# Patient Record
Sex: MALE | Race: WHITE
[De-identification: names, ages, dates, MRNs, and addresses within clinical notes are randomized per-mention and may not be internally consistent; named-entity substitution may affect disease eponyms.]

---

## 2018-07-26 ENCOUNTER — HOSPITAL ENCOUNTER (EMERGENCY)
Dept: HOSPITAL 89 - ER | Age: 23
Discharge: HOME | End: 2018-07-26
Payer: SELF-PAY

## 2018-07-26 ENCOUNTER — HOSPITAL ENCOUNTER (OUTPATIENT)
Dept: HOSPITAL 89 - AMB | Age: 23
End: 2018-07-26
Payer: COMMERCIAL

## 2018-07-26 VITALS — DIASTOLIC BLOOD PRESSURE: 57 MMHG | SYSTOLIC BLOOD PRESSURE: 119 MMHG

## 2018-07-26 VITALS — BODY MASS INDEX: 35.73 KG/M2

## 2018-07-26 DIAGNOSIS — F10.129: Primary | ICD-10-CM

## 2018-07-26 DIAGNOSIS — R10.12: ICD-10-CM

## 2018-07-26 DIAGNOSIS — R79.89: ICD-10-CM

## 2018-07-26 LAB — PLATELET COUNT, AUTOMATED: 223 K/UL (ref 150–450)

## 2018-07-26 PROCEDURE — 36415 COLL VENOUS BLD VENIPUNCTURE: CPT

## 2018-07-26 PROCEDURE — 82947 ASSAY GLUCOSE BLOOD QUANT: CPT

## 2018-07-26 PROCEDURE — 80320 DRUG SCREEN QUANTALCOHOLS: CPT

## 2018-07-26 PROCEDURE — 84460 ALANINE AMINO (ALT) (SGPT): CPT

## 2018-07-26 PROCEDURE — 84520 ASSAY OF UREA NITROGEN: CPT

## 2018-07-26 PROCEDURE — 82040 ASSAY OF SERUM ALBUMIN: CPT

## 2018-07-26 PROCEDURE — 82247 BILIRUBIN TOTAL: CPT

## 2018-07-26 PROCEDURE — 99283 EMERGENCY DEPT VISIT LOW MDM: CPT

## 2018-07-26 PROCEDURE — 83735 ASSAY OF MAGNESIUM: CPT

## 2018-07-26 PROCEDURE — 84155 ASSAY OF PROTEIN SERUM: CPT

## 2018-07-26 PROCEDURE — 96360 HYDRATION IV INFUSION INIT: CPT

## 2018-07-26 PROCEDURE — 84443 ASSAY THYROID STIM HORMONE: CPT

## 2018-07-26 PROCEDURE — 82374 ASSAY BLOOD CARBON DIOXIDE: CPT

## 2018-07-26 PROCEDURE — 84450 TRANSFERASE (AST) (SGOT): CPT

## 2018-07-26 PROCEDURE — 82565 ASSAY OF CREATININE: CPT

## 2018-07-26 PROCEDURE — 82310 ASSAY OF CALCIUM: CPT

## 2018-07-26 PROCEDURE — 85025 COMPLETE CBC W/AUTO DIFF WBC: CPT

## 2018-07-26 PROCEDURE — 84295 ASSAY OF SERUM SODIUM: CPT

## 2018-07-26 PROCEDURE — 84075 ASSAY ALKALINE PHOSPHATASE: CPT

## 2018-07-26 PROCEDURE — 84132 ASSAY OF SERUM POTASSIUM: CPT

## 2018-07-26 PROCEDURE — 83690 ASSAY OF LIPASE: CPT

## 2018-07-26 PROCEDURE — 80329 ANALGESICS NON-OPIOID 1 OR 2: CPT

## 2018-07-26 PROCEDURE — 82435 ASSAY OF BLOOD CHLORIDE: CPT

## 2018-07-26 NOTE — ER REPORT
History and Physical


Time Seen By MD:  16:50


Hx. of Stated Complaint:  


PATIENT WAS BROUGHT IN BY EMS; EMS SAY ROOM MATE OR FRIEND CALLED EMS FOR PT; 


FRIEND STATED TO THEM THAT PATIENT WAS KICKED OUT OF PARENTS HOUSE LAST WEEK? 


PATIENT HAS BEEN DRINKING SINCE THEN; PATIENT STATED THAT HE HAD 9/10 UPPER 

LEFT 


EPIGASTRIC PAIN; PATIENT WAS GIVEN 4MG OF ZOFRAN AND BLOOD SUGAR WAS 91 BY EMS; 


ON ARRIVAL PT WAS SLEEPING "PASSED OUT"


HPI/ROS


CHIEF COMPLAINT: Alcohol Intoxication





HISTORY OF PRESENT ILLNESS: 23-year-old male patient presents to emergency room 

with complaint of alcohol intoxication. Patient states that today he drank 2 

bottles of vodka. Patient states that he is alcoholic and likes to drink. 

Patient states that he passed out. A friend of his found him passed out. He is 

concerned about possible alcohol poisoning. EMS was contacted. When they 

arrived patient states he was having abdominal pain. And agreed to come to the 

emergency room. Patient states he's not had any nausea, vomiting or diarrhea. 

Patient states pain is in the left upper quadrant. Patient states is nothing 

seems to make the pain better although it is worse with palpation. Patient 

denies any loss of bowel or bladder control.





REVIEW OF SYSTEMS:


Respiratory: No cough, no dyspnea.


Cardiovascular: No chest pain, no palpitations.


Gastrointestinal: As noted above


Musculoskeletal: No back pain.


Allergies:  


Coded Allergies:  


     No Known Drug Allergies (Unverified , 7/26/18)


Past Medical/Surgical History


Patient has a past medical history of alcohol abuse.


Unable To Obtain Past Medical:  Unable to Obtain/Update


Reviewed Nurses Notes:  Yes


Constitutional





Vital Sign - Last 24 Hours








 7/26/18





 16:44


 


Temp 98.3


 


Pulse 119


 


Resp 19


 


B/P (MAP) 119/57


 


Pulse Ox 90


 


O2 Delivery Room Air














Intake and Output   


 


 7/26/18 7/26/18 7/27/18





 15:00 23:00 07:00


 


Intake Total  1000 ml 


 


Balance  1000 ml 








Physical Exam


  General Appearance: The patient is somnolent but arouses to stimulation, has 

no immediate need for airway protection and no current signs of toxicity.


Respiratory: Chest is non tender, lungs are clear to auscultation.


Cardiac: regular rate and rhythm


Gastrointestinal: Abdomen is soft and tender in the left upper quadrant, no 

masses, bowel sounds normal.


Musculoskeletal:  Neck: Neck is supple and non tender.


   Extremities have full range of motion and are non tender.


Skin: No rashes or lesions.





DIFFERENTIAL DIAGNOSIS: After history and physical exam differential diagnosis 

was considered for pancreatitis, alcohol abuse, alcohol induced gastritis.





Medical Decision Making


Data Points


Result Diagram:  


7/26/18 1727                                                                   

             7/26/18 1727





Laboratory





Hematology








Test


  7/26/18


17:27


 


Red Blood Count


  5.05 M/uL


(4.00-5.60)


 


Mean Corpuscular Volume


  89.1 fL


(80.0-96.0)


 


Mean Corpuscular Hemoglobin


  31.1 pg


(26.0-33.0)


 


Mean Corpuscular Hemoglobin


Concent 34.9 g/dL


(32.0-36.0)


 


Red Cell Distribution Width


  14.4 %


(11.5-14.5)


 


Mean Platelet Volume


  9.3 fL


(7.2-11.1)


 


Neutrophils (%) (Auto)


  68.7 %


(39.4-72.5)


 


Lymphocytes (%) (Auto)


  23.3 %


(17.6-49.6)


 


Monocytes (%) (Auto)


  5.2 %


(4.1-12.4)


 


Eosinophils (%) (Auto)


  1.2 %


(0.4-6.7)


 


Basophils (%) (Auto)


  1.6 %


(0.3-1.4)


 


Nucleated RBC Relative Count


(auto) 0.0 /100WBC 


 


 


Neutrophils # (Auto)


  4.1 K/uL


(2.0-7.4)


 


Lymphocytes # (Auto)


  1.4 K/uL


(1.3-3.6)


 


Monocytes # (Auto)


  0.3 K/uL


(0.3-1.0)


 


Eosinophils # (Auto)


  0.1 K/uL


(0.0-0.5)


 


Basophils # (Auto)


  0.1 K/uL


(0.0-0.1)


 


Nucleated RBC Absolute Count


(auto) 0.00 K/uL 


 


 


Sodium Level


  143 mmol/L


(137-145)


 


Potassium Level


  3.6 mmol/L


(3.5-5.0)


 


Chloride Level


  110 mmol/L


()


 


Carbon Dioxide Level


  22 mmol/L


(22-30)


 


Blood Urea Nitrogen


  14 mg/dl


(9-21)


 


Creatinine


  1.00 mg/dl


(0.66-1.25)


 


Glomerular Filtration Rate


Calc > 60.0 


 


 


Random Glucose


  85 mg/dl


()


 


Calcium Level


  7.7 mg/dl


(8.4-10.2)


 


Magnesium Level


  2.1 mg/dl


(1.7-2.2)


 


Total Bilirubin


  0.3 mg/dl


(0.2-1.3)


 


Aspartate Amino Transf


(AST/SGOT) 51 U/L (0-35) 


 


 


Alanine Aminotransferase


(ALT/SGPT) 32 U/L (0-56) 


 


 


Alkaline Phosphatase 50 U/L (0-126) 


 


Total Protein


  5.6 g/dl


(6.3-8.2)


 


Albumin


  3.6 g/dl


(3.5-5.0)


 


Lipase


  51 U/L


()


 


Salicylates Level < 10 mg/L 


 


Salicylate Last Dose Date unk 


 


Acetaminophen Level < 10 ug/ml 


 


Serum Alcohol 255 mg/dl 








Chemistry








Test


  7/26/18


17:27


 


White Blood Count


  6.0 k/uL


(4.5-11.0)


 


Red Blood Count


  5.05 M/uL


(4.00-5.60)


 


Hemoglobin


  15.7 g/dL


(14.0-18.0)


 


Hematocrit


  45.0 %


(42.0-52.0)


 


Mean Corpuscular Volume


  89.1 fL


(80.0-96.0)


 


Mean Corpuscular Hemoglobin


  31.1 pg


(26.0-33.0)


 


Mean Corpuscular Hemoglobin


Concent 34.9 g/dL


(32.0-36.0)


 


Red Cell Distribution Width


  14.4 %


(11.5-14.5)


 


Platelet Count


  223 K/uL


(150-450)


 


Mean Platelet Volume


  9.3 fL


(7.2-11.1)


 


Neutrophils (%) (Auto)


  68.7 %


(39.4-72.5)


 


Lymphocytes (%) (Auto)


  23.3 %


(17.6-49.6)


 


Monocytes (%) (Auto)


  5.2 %


(4.1-12.4)


 


Eosinophils (%) (Auto)


  1.2 %


(0.4-6.7)


 


Basophils (%) (Auto)


  1.6 %


(0.3-1.4)


 


Nucleated RBC Relative Count


(auto) 0.0 /100WBC 


 


 


Neutrophils # (Auto)


  4.1 K/uL


(2.0-7.4)


 


Lymphocytes # (Auto)


  1.4 K/uL


(1.3-3.6)


 


Monocytes # (Auto)


  0.3 K/uL


(0.3-1.0)


 


Eosinophils # (Auto)


  0.1 K/uL


(0.0-0.5)


 


Basophils # (Auto)


  0.1 K/uL


(0.0-0.1)


 


Nucleated RBC Absolute Count


(auto) 0.00 K/uL 


 


 


Glomerular Filtration Rate


Calc > 60.0 


 


 


Calcium Level


  7.7 mg/dl


(8.4-10.2)


 


Magnesium Level


  2.1 mg/dl


(1.7-2.2)


 


Total Bilirubin


  0.3 mg/dl


(0.2-1.3)


 


Aspartate Amino Transf


(AST/SGOT) 51 U/L (0-35) 


 


 


Alanine Aminotransferase


(ALT/SGPT) 32 U/L (0-56) 


 


 


Alkaline Phosphatase 50 U/L (0-126) 


 


Total Protein


  5.6 g/dl


(6.3-8.2)


 


Albumin


  3.6 g/dl


(3.5-5.0)


 


Lipase


  51 U/L


()


 


Salicylates Level < 10 mg/L 


 


Salicylate Last Dose Date unk 


 


Acetaminophen Level < 10 ug/ml 


 


Serum Alcohol 255 mg/dl 








Toxicology








Test


  7/26/18


17:27


 


Salicylates Level < 10 mg/L 


 


Salicylate Last Dose Date unk 


 


Acetaminophen Level < 10 ug/ml 


 


Serum Alcohol 255 mg/dl 











ED Course/Re-evaluation


ED Course


Patient was admitted and examined, history of physical were obtained. 

Differential diagnoses were considered. On examination lungs are clear, heart 

is regular, abdomen soft and tender in the left upper quadrant. Labs for a 

behavioral health admission were done. Included in those labs were a lipase. 

Labs were unremarkable, although he did have a slightly elevated AST. His 

lipase was 51. At this time I do not believe patient has a pancreatitis. I 

discussed admission to behavioral health with the patient. He states that he 

would like to do that. I spoke with Dr. Smith, who agreed to accept the 

patient for admission. When the behavioral health tech came down to talk with 

the patient and he felt that there was a loculated patient refused this time. 

He states he would like to go home. I discussed with him that he does need to 

make some changes in his life. I would encourage him to avoid drinking. He is 

return to emergency room if condition worsens. Patient verbalized understanding 

and agreement with plan.


Decision to Disposition Date:  Jul 26, 2018


Decision to Disposition Time:  19:35





Depart


Departure


Latest Vital Signs





Vital Signs








  Date Time  Temp Pulse Resp B/P (MAP) Pulse Ox O2 Delivery O2 Flow Rate FiO2


 


7/26/18 16:44 98.3 119 19 119/57 90 Room Air  








Impression:  


 Primary Impression:  


 Alcohol abuse


Condition:  Condition Unchanged


Disposition:  HOME OR SELF-CARE


Patient Instructions:  Abuse of Alcohol (ED)





Additional Instructions:  


Avoid alcohol.


Get plenty of rest.


Return to the ER if you feel like you would like to be admitted to detox from 

alcohol.


Follow up with your counselor tomorrow or early next week.











MIR DUONG Jul 26, 2018 16:53

## 2018-07-29 ENCOUNTER — HOSPITAL ENCOUNTER (INPATIENT)
Dept: HOSPITAL 89 - ER | Age: 23
LOS: 1 days | Discharge: PSYCHIATRIC OR SUBSTANCE USE FACILITY | DRG: 897 | End: 2018-07-30
Attending: INTERNAL MEDICINE | Admitting: INTERNAL MEDICINE
Payer: COMMERCIAL

## 2018-07-29 ENCOUNTER — HOSPITAL ENCOUNTER (OUTPATIENT)
Dept: HOSPITAL 89 - AMB | Age: 23
End: 2018-07-29
Payer: COMMERCIAL

## 2018-07-29 VITALS — SYSTOLIC BLOOD PRESSURE: 123 MMHG | DIASTOLIC BLOOD PRESSURE: 69 MMHG

## 2018-07-29 VITALS
BODY MASS INDEX: 35.84 KG/M2 | HEIGHT: 69 IN | WEIGHT: 242 LBS | HEIGHT: 69 IN | BODY MASS INDEX: 35.84 KG/M2 | WEIGHT: 242 LBS

## 2018-07-29 VITALS — SYSTOLIC BLOOD PRESSURE: 142 MMHG | DIASTOLIC BLOOD PRESSURE: 76 MMHG

## 2018-07-29 VITALS — BODY MASS INDEX: 35.73 KG/M2

## 2018-07-29 VITALS — SYSTOLIC BLOOD PRESSURE: 137 MMHG | DIASTOLIC BLOOD PRESSURE: 63 MMHG

## 2018-07-29 VITALS — SYSTOLIC BLOOD PRESSURE: 135 MMHG | DIASTOLIC BLOOD PRESSURE: 65 MMHG

## 2018-07-29 DIAGNOSIS — F10.951: Primary | ICD-10-CM
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
## 2018-07-29 DIAGNOSIS — F12.10: ICD-10-CM

## 2018-07-29 DIAGNOSIS — R53.1: ICD-10-CM

## 2018-07-29 DIAGNOSIS — R00.0: ICD-10-CM

## 2018-07-29 DIAGNOSIS — F10.231: Primary | ICD-10-CM

## 2018-07-29 DIAGNOSIS — R61: ICD-10-CM

## 2018-07-29 DIAGNOSIS — F17.210: ICD-10-CM

## 2018-07-29 LAB — PLATELET COUNT, AUTOMATED: 296 K/UL (ref 150–450)

## 2018-07-29 PROCEDURE — 84450 TRANSFERASE (AST) (SGOT): CPT

## 2018-07-29 PROCEDURE — 84460 ALANINE AMINO (ALT) (SGPT): CPT

## 2018-07-29 PROCEDURE — 80305 DRUG TEST PRSMV DIR OPT OBS: CPT

## 2018-07-29 PROCEDURE — 82565 ASSAY OF CREATININE: CPT

## 2018-07-29 PROCEDURE — 82374 ASSAY BLOOD CARBON DIOXIDE: CPT

## 2018-07-29 PROCEDURE — 81001 URINALYSIS AUTO W/SCOPE: CPT

## 2018-07-29 PROCEDURE — 82435 ASSAY OF BLOOD CHLORIDE: CPT

## 2018-07-29 PROCEDURE — 84075 ASSAY ALKALINE PHOSPHATASE: CPT

## 2018-07-29 PROCEDURE — 82247 BILIRUBIN TOTAL: CPT

## 2018-07-29 PROCEDURE — 80320 DRUG SCREEN QUANTALCOHOLS: CPT

## 2018-07-29 PROCEDURE — 84295 ASSAY OF SERUM SODIUM: CPT

## 2018-07-29 PROCEDURE — 83690 ASSAY OF LIPASE: CPT

## 2018-07-29 PROCEDURE — 82947 ASSAY GLUCOSE BLOOD QUANT: CPT

## 2018-07-29 PROCEDURE — 83735 ASSAY OF MAGNESIUM: CPT

## 2018-07-29 PROCEDURE — 82310 ASSAY OF CALCIUM: CPT

## 2018-07-29 PROCEDURE — 85025 COMPLETE CBC W/AUTO DIFF WBC: CPT

## 2018-07-29 PROCEDURE — 80329 ANALGESICS NON-OPIOID 1 OR 2: CPT

## 2018-07-29 PROCEDURE — 84155 ASSAY OF PROTEIN SERUM: CPT

## 2018-07-29 PROCEDURE — 84520 ASSAY OF UREA NITROGEN: CPT

## 2018-07-29 PROCEDURE — 84132 ASSAY OF SERUM POTASSIUM: CPT

## 2018-07-29 PROCEDURE — 93005 ELECTROCARDIOGRAM TRACING: CPT

## 2018-07-29 PROCEDURE — 82040 ASSAY OF SERUM ALBUMIN: CPT

## 2018-07-29 RX ADMIN — NICOTINE PRN MG: 4 INHALANT RESPIRATORY (INHALATION) at 18:02

## 2018-07-29 RX ADMIN — NICOTINE PRN MG: 4 INHALANT RESPIRATORY (INHALATION) at 22:05

## 2018-07-29 NOTE — EKG
[place]AC[place] [facility] [place] 

 

PATIENT NAME: CONCEPCION BRITO

: [id number]

MR: [id number]

V: [id number]

EXAM DATE: 

ORDERING PHYSICIAN: MACARIO HUYNH

TECHNOLOGIST: 

 

Test Reason : 

Blood Pressure : ***/*** mmHG

Vent. Rate : 115 BPM     Atrial Rate : 115 BPM

   P-R Int : 164 ms          QRS Dur : 094 ms

    QT Int : 328 ms       P-R-T Axes : 060 085 040 degrees

   QTc Int : 453 ms

 

Sinus tachycardia

Otherwise normal ECG

No previous ECGs available

Confirmed by AKANKSHA MORENO ([id number]) on 2018 9:26:03 PM

 

Referred By:             Confirmed By:AKANKSHA MORENO

## 2018-07-29 NOTE — ER REPORT
History and Physical


Time Seen By MD:  16:27


Hx. of Stated Complaint:  


DETOX - HALLUCINATIONS


HPI/ROS


CHIEF COMPLAINT: Detoxing, hallucinating





HISTORY OF PRESENT ILLNESS: Pt was seen 2 d ago for detox admission and eloped b

/c he could not smoke, states last drink was 12 hrs ago, drinks 1/2 gallon etoh 

daily, admits to mj, but denies other drugs. Denies si, now admits to visual 

hallucinations, skin crawling, agitation. Denies ha, n/v/injuries





REVIEW OF SYSTEMS:


Constitutional: No fever, no chills.


Eyes: No discharge.


ENT: No sore throat. 


Cardiovascular: No chest pain, + palpitations.


Respiratory: No cough, no shortness of breath.


Gastrointestinal: No abdominal pain, no vomiting.


Genitourinary: No hematuria.


Musculoskeletal: No back pain.


Skin: No rashes.


Neurological: mild ha


Allergies:  


Coded Allergies:  


     No Known Drug Allergies (Unverified , 7/26/18)


Hx Smoking:  Yes


Constitutional





Vital Sign - Last 24 Hours








 7/29/18





 16:13


 


Pulse 114


 


Resp 18


 


B/P (MAP) 146/85


 


Pulse Ox 94


 


O2 Delivery Room Air








Physical Exam


   General Appearance: The patient is alert, has no immediate need for airway 

protection and no signs of toxicity., however is agitated, tachycardic, mildly 

diaphoretic


Eyes: Pupils equal and round no pallor or injection. bilat horizontal nystagmus


ENT, Mouth: Mucous membranes are moist.


Respiratory: There are no retractions, lungs are clear to auscultation.


Cardiovascular: tachycardia


Gastrointestinal:  Abdomen is soft and non tender, no masses, bowel sounds 

normal.


Neurological: alert, mildly confused, responding to visual hallucinations


Skin: mild diaphoretic


Musculoskeletal:  Neck is supple non tender.


      Extremities are nontender, nonswollen and have full range of motion.





DIFFERENTIAL DIAGNOSIS: After history and physical exam differential diagnosis 

was considered for drug/etoh od/withdrawal, toxic/metabolic/septic etiology





Medical Decision Making


Data Points


Result Diagram:  


7/29/18 1606                                                                   

             7/29/18 1606





Laboratory





Hematology








Test


  7/29/18


16:06 7/29/18


17:19


 


Red Blood Count


  5.77 M/uL


(4.00-5.60) 


 


 


Mean Corpuscular Volume


  87.2 fL


(80.0-96.0) 


 


 


Mean Corpuscular Hemoglobin


  30.8 pg


(26.0-33.0) 


 


 


Mean Corpuscular Hemoglobin


Concent 35.3 g/dL


(32.0-36.0) 


 


 


Red Cell Distribution Width


  14.1 %


(11.5-14.5) 


 


 


Mean Platelet Volume


  8.2 fL


(7.2-11.1) 


 


 


Neutrophils (%) (Auto)


  71.7 %


(39.4-72.5) 


 


 


Lymphocytes (%) (Auto)


  20.5 %


(17.6-49.6) 


 


 


Monocytes (%) (Auto)


  5.5 %


(4.1-12.4) 


 


 


Eosinophils (%) (Auto)


  0.9 %


(0.4-6.7) 


 


 


Basophils (%) (Auto)


  1.4 %


(0.3-1.4) 


 


 


Nucleated RBC Relative Count


(auto) 0.1 /100WBC 


  


 


 


Neutrophils # (Auto)


  5.5 K/uL


(2.0-7.4) 


 


 


Lymphocytes # (Auto)


  1.6 K/uL


(1.3-3.6) 


 


 


Monocytes # (Auto)


  0.4 K/uL


(0.3-1.0) 


 


 


Eosinophils # (Auto)


  0.1 K/uL


(0.0-0.5) 


 


 


Basophils # (Auto)


  0.1 K/uL


(0.0-0.1) 


 


 


Nucleated RBC Absolute Count


(auto) 0.01 K/uL 


  


 


 


Sodium Level


  144 mmol/L


(137-145) 


 


 


Potassium Level


  3.6 mmol/L


(3.5-5.0) 


 


 


Chloride Level


  106 mmol/L


() 


 


 


Carbon Dioxide Level


  19 mmol/L


(22-30) 


 


 


Blood Urea Nitrogen


  12 mg/dl


(9-21) 


 


 


Creatinine


  1.10 mg/dl


(0.66-1.25) 


 


 


Glomerular Filtration Rate


Calc > 60.0 


  


 


 


Random Glucose


  132 mg/dl


() 


 


 


Calcium Level


  8.6 mg/dl


(8.4-10.2) 


 


 


Magnesium Level


  1.9 mg/dl


(1.7-2.2) 


 


 


Total Bilirubin


  0.4 mg/dl


(0.2-1.3) 


 


 


Aspartate Amino Transf


(AST/SGOT) 79 U/L (0-35) 


  


 


 


Alanine Aminotransferase


(ALT/SGPT) 55 U/L (0-56) 


  


 


 


Alkaline Phosphatase 80 U/L (0-126)  


 


Total Protein


  6.3 g/dl


(6.3-8.2) 


 


 


Albumin


  4.3 g/dl


(3.5-5.0) 


 


 


Lipase


  71 U/L


() 


 


 


Salicylates Level < 10 mg/L  


 


Salicylate Last Dose Date unk  


 


Acetaminophen Level < 10 ug/ml  


 


Serum Alcohol 324 mg/dl  








Chemistry








Test


  7/29/18


16:06 7/29/18


17:19


 


White Blood Count


  7.6 k/uL


(4.5-11.0) 


 


 


Red Blood Count


  5.77 M/uL


(4.00-5.60) 


 


 


Hemoglobin


  17.8 g/dL


(14.0-18.0) 


 


 


Hematocrit


  50.3 %


(42.0-52.0) 


 


 


Mean Corpuscular Volume


  87.2 fL


(80.0-96.0) 


 


 


Mean Corpuscular Hemoglobin


  30.8 pg


(26.0-33.0) 


 


 


Mean Corpuscular Hemoglobin


Concent 35.3 g/dL


(32.0-36.0) 


 


 


Red Cell Distribution Width


  14.1 %


(11.5-14.5) 


 


 


Platelet Count


  296 K/uL


(150-450) 


 


 


Mean Platelet Volume


  8.2 fL


(7.2-11.1) 


 


 


Neutrophils (%) (Auto)


  71.7 %


(39.4-72.5) 


 


 


Lymphocytes (%) (Auto)


  20.5 %


(17.6-49.6) 


 


 


Monocytes (%) (Auto)


  5.5 %


(4.1-12.4) 


 


 


Eosinophils (%) (Auto)


  0.9 %


(0.4-6.7) 


 


 


Basophils (%) (Auto)


  1.4 %


(0.3-1.4) 


 


 


Nucleated RBC Relative Count


(auto) 0.1 /100WBC 


  


 


 


Neutrophils # (Auto)


  5.5 K/uL


(2.0-7.4) 


 


 


Lymphocytes # (Auto)


  1.6 K/uL


(1.3-3.6) 


 


 


Monocytes # (Auto)


  0.4 K/uL


(0.3-1.0) 


 


 


Eosinophils # (Auto)


  0.1 K/uL


(0.0-0.5) 


 


 


Basophils # (Auto)


  0.1 K/uL


(0.0-0.1) 


 


 


Nucleated RBC Absolute Count


(auto) 0.01 K/uL 


  


 


 


Glomerular Filtration Rate


Calc > 60.0 


  


 


 


Calcium Level


  8.6 mg/dl


(8.4-10.2) 


 


 


Magnesium Level


  1.9 mg/dl


(1.7-2.2) 


 


 


Total Bilirubin


  0.4 mg/dl


(0.2-1.3) 


 


 


Aspartate Amino Transf


(AST/SGOT) 79 U/L (0-35) 


  


 


 


Alanine Aminotransferase


(ALT/SGPT) 55 U/L (0-56) 


  


 


 


Alkaline Phosphatase 80 U/L (0-126)  


 


Total Protein


  6.3 g/dl


(6.3-8.2) 


 


 


Albumin


  4.3 g/dl


(3.5-5.0) 


 


 


Lipase


  71 U/L


() 


 


 


Salicylates Level < 10 mg/L  


 


Salicylate Last Dose Date unk  


 


Acetaminophen Level < 10 ug/ml  


 


Serum Alcohol 324 mg/dl  








Toxicology








Test


  7/29/18


16:06 7/29/18


17:19


 


Salicylates Level < 10 mg/L  


 


Salicylate Last Dose Date unk  


 


Acetaminophen Level < 10 ug/ml  


 


Serum Alcohol 324 mg/dl  








Urinalysis








Test


  7/29/18


17:19











EKG/Imaging


EKG Interpretation


12 lead EKG:


      Rhythm:sinus tachycardia


      Axis: normal 


      QRS: normal


      ST segments: normal


      [ ]


Monitor Interpretation:  Sinus Tachycardia





ED Course/Re-evaluation


ED Course


Pt presents with hallucinations from etoh use/withdrawal. Tachcyardia; 

considered other etiologies of ams, but likely assoc with etoh.  consulted 

and plan to admit


I spoke with pt's mother, Juhi, at his request - [phone]; she asks to be 

called for any concerns; states that when he's previously gone through DT's he'

s become violent/combative and been a flight risk. As far as she knows, he 

drinks etoh and other than MJ, no drugs.  Pt is cooperative in ED, but does ask 

at one point to be taken to the airport to fly to [place]. Given hallucinations

, need for occasional redirection, tachycardia, need for IV, D/w Dr. Nance but 

pt requires medical admission until stable. Accepted at 1730 by Dr. Durbin.


Decision to Disposition Date:  Jul 29, 2018


Decision to Disposition Time:  17:30





Depart


Departure


Latest Vital Signs





Vital Signs








  Date Time  Temp Pulse Resp B/P (MAP) Pulse Ox O2 Delivery O2 Flow Rate FiO2


 


7/29/18 16:13  114 18 146/85 94 Room Air  








Impression:  


 Primary Impression:  


 Alcohol intoxication delirium


 Additional Impression:  


 Delirium tremens


Condition:  Condition Unchanged


Disposition:  Admitted from ER





Problem Qualifiers











MACARIO HUYNH MD Jul 29, 2018 16:41

## 2018-07-30 ENCOUNTER — HOSPITAL ENCOUNTER (INPATIENT)
Dept: HOSPITAL 89 - BHS | Age: 23
LOS: 3 days | Discharge: HOME | DRG: 897 | End: 2018-08-02
Attending: PSYCHIATRY & NEUROLOGY | Admitting: PSYCHIATRY & NEUROLOGY
Payer: COMMERCIAL

## 2018-07-30 VITALS — SYSTOLIC BLOOD PRESSURE: 144 MMHG | DIASTOLIC BLOOD PRESSURE: 80 MMHG

## 2018-07-30 VITALS
HEIGHT: 69 IN | BODY MASS INDEX: 34.07 KG/M2 | HEIGHT: 69 IN | BODY MASS INDEX: 34.07 KG/M2 | WEIGHT: 230 LBS | WEIGHT: 230 LBS

## 2018-07-30 VITALS — SYSTOLIC BLOOD PRESSURE: 147 MMHG | DIASTOLIC BLOOD PRESSURE: 92 MMHG

## 2018-07-30 VITALS — DIASTOLIC BLOOD PRESSURE: 50 MMHG | SYSTOLIC BLOOD PRESSURE: 113 MMHG

## 2018-07-30 VITALS — DIASTOLIC BLOOD PRESSURE: 89 MMHG | SYSTOLIC BLOOD PRESSURE: 154 MMHG

## 2018-07-30 VITALS — DIASTOLIC BLOOD PRESSURE: 89 MMHG | SYSTOLIC BLOOD PRESSURE: 155 MMHG

## 2018-07-30 VITALS — SYSTOLIC BLOOD PRESSURE: 126 MMHG | DIASTOLIC BLOOD PRESSURE: 57 MMHG

## 2018-07-30 VITALS — DIASTOLIC BLOOD PRESSURE: 87 MMHG | SYSTOLIC BLOOD PRESSURE: 147 MMHG

## 2018-07-30 VITALS — DIASTOLIC BLOOD PRESSURE: 86 MMHG | SYSTOLIC BLOOD PRESSURE: 152 MMHG

## 2018-07-30 VITALS — SYSTOLIC BLOOD PRESSURE: 147 MMHG | DIASTOLIC BLOOD PRESSURE: 75 MMHG

## 2018-07-30 VITALS — SYSTOLIC BLOOD PRESSURE: 132 MMHG | DIASTOLIC BLOOD PRESSURE: 62 MMHG
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
## 2018-07-30 DIAGNOSIS — F10.231: Primary | ICD-10-CM

## 2018-07-30 DIAGNOSIS — F34.1: ICD-10-CM

## 2018-07-30 DIAGNOSIS — I10: ICD-10-CM

## 2018-07-30 DIAGNOSIS — G47.30: ICD-10-CM

## 2018-07-30 DIAGNOSIS — F12.20: ICD-10-CM

## 2018-07-30 RX ADMIN — DIAZEPAM PRN MG: 10 TABLET ORAL at 10:40

## 2018-07-30 RX ADMIN — DIAZEPAM PRN MG: 10 TABLET ORAL at 20:28

## 2018-07-30 RX ADMIN — DIAZEPAM PRN MG: 10 TABLET ORAL at 21:21

## 2018-07-30 RX ADMIN — DIAZEPAM PRN MG: 10 TABLET ORAL at 22:54

## 2018-07-30 RX ADMIN — NICOTINE PRN MG: 4 INHALANT RESPIRATORY (INHALATION) at 19:58

## 2018-07-30 RX ADMIN — DIAZEPAM PRN MG: 10 TABLET ORAL at 09:38

## 2018-07-30 RX ADMIN — DIAZEPAM PRN MG: 10 TABLET ORAL at 08:37

## 2018-07-30 NOTE — HOSPITALIST DEPART
Discharge Summary


Reason for Hosp/Final Diag:  


(1) Alcohol abuse


Status:  Chronic


Hospital Course & Plan:  The patient has a long history of etoh abuse and has 

been to alcohol rehab 3 times. He presented for help for detoxification.





(2) Delirium tremens


Status:  Acute


Hospital Course & Plan:  He continued to have hallucinations this morning, but 

cleared later this afternoon after several doses of Valium.  He is receiving 

thiamine.  He requested to leave against medical advice, but was found to be a 

risk to himself after evaluation by the mental health counselor.  He is being 

detained to [facility] for further evaluation.





Departure


Weight (Pounds):  242


Result Diagram:  


7/29/18 1606                                                                   

             7/29/18 1606





Condition:  No Change


Discharge:  [facility]





Discharge Instructions


Diet:  Regular


Activity:  As Tolerated


Special Instructions:  








Venous Thromboembolism


Antithrombotics


Is Pt On Any Antithrombotics?:  No











MELISA MEYERS DO Jul 30, 2018 17:39

## 2018-07-30 NOTE — [facility] - PSYCHIATRIC EVALUATION
ER - Title 25 MHE Evaluation


Title 25 Evaluation


Patient Detained By:  Therapist ([facility]


Referral Source:  Dr. Guerrero, Hospitalist & Sejal Hamilton, Licensed Addiction 

Therapist


Date Patient Detained:  Jul 30, 2018


Time Patient Detained:  17:17


Date FPC Expires:  Aug 2, 2018


Time [facility] Expires:  17:17


Legal Status: Police Hold:  No


Legal Status: Residence:  State Resident, Other (From [facility] [place])


Assessment Data Provided By:  Patient, Other Provider (Dr. Guerrero, Hospitalist), 

Other Source (Patient's Electronic record)


HPI/ROS:  


Assessment from Dr. Guerrero: 


"(1) Alcohol abuse


Status:  Chronic


Hospital Course & Plan:  The patient has a long history of etoh abuse and


has been to alcohol rehab 3 times. He presented for help for


detoxification.





(2) Delirium tremens


Status:  Acute


Hospital Course & Plan:  He continued to have hallucinations this morning,


but cleared later this afternoon after several doses of Valium.  He is


receiving thiamine.  He requested to leave against medical advice, but was


found to be a risk to himself after evaluation by the mental health


counselor.  He is being detained to [facility] for further evaluation."


Admit due to SI or Attempt:  No


Suicide Plan:  No Plan


Current Suicide Plan


Denies any plan


Alcohol or Drugs Involved:  Yes


Current Intoxication Info:


Patient taking a long while to safely detox


Is Patient Info Reliable:  Yes


Is Collateral Info Reliable:  Yes


Current Home Psych Meds:


None currently that are known.





Mental Status Exam


General Appearance:  Cooperative, Polite, Good Interaction, Tearful (patient 

apologized for crying.)


Speech:  Clear, Garbled (Occassionally mixes up words related to detox)


Mood:  Dysthmic/Depressed


Affect:  Sad, Tearful, Anxious


Thought Process:  Goal Directed (Wants to leave.)


Thought Content:  Suicidal Ideation (Denies), Visual Hallucinations (Says 

before he was admitted he was hallucinating because he had no alcohol, so he 

was forced into an unsafe detox before he came to the hospital and was admitted.

)


Cognition:  Alert & Oriented-Person, Alert & Oriented-Place, No Alert & Oriented

-Time, No Alert-Oriented-Situation


Memory:  Immediate, Recent, Remote


Insight Judgment:  Poor


Hallucinations:  Visual (Reports visual hallucinations led him to believe 

hospitalization was important for his safety.)





Current Risk & History


Current Dangerous Risk Assessm:  Current Suicide Ideation (Denies), Self-

Injurious Behaviors (Dangerous for patient to leave the hospital before safely 

detoxed.), Ubable to Care for Self (Is not totally aware of how unsafe his 

condition is outside of a hospital where he can be safely detoxed.)


Past Dangerous Risk Assessm:  Other (Denies)


Prior Alcohol/Drug Abuse


Long history of alcohol and marijuana use.  Says he was court ordered to do 

residential treatment one time because he was honest with his  

about using drugs.


Sequelae of Substance Abuse:


Patient says he has been drinking heavily for the last three years because he 

says he has anxiety and depression.


Previous Suicide Attempt:  No Previous Attempt


Previous Psychiatric Illness:  Yes (Patient says he has been hospitalized in 

[place], [place] before.)


Previous Psychiatric Treatment:  Yes (Reports previous treatment for Anxiety, 

Depression and Alcohol Use Disorder severe.)


Previous Treatment Description


Patient says he has been to inpatient rehab four times.  He says tearfully, 

"Something's got to change."





Risk Assessment & Disposition


Evaluated Risk Assessment:


Patient said he wanted to leave the hospital against medical advice.  says, "I'

m here because I am stubborn." Now recognizes, "I'm not ready to go.  I can 

tell by the way I am not all there with my thoughts and the way I am walking 

that I am not ready.  I just hate hospitals so much."


Impression:  


 Primary Impression:  


 Alcohol abuse


 Additional Impressions:  


 Delirium tremens


 Substance abuse


Meets Mental Illness Req.:  Yes


Meets Dangerousness Req.:  Yes


Emergency FPC to be:  Upheld


Decision Comment:


Patient recently had a very bad fight with his father, is kicked out of his home

, homeless, and away from his weekly outpatient therapy support.  He is very 

sad and unable to detox on his own safely.  he runs the risk of dying if he is 

not detoxed safely.  Leaving against medical advice is quite life threatening 

for this patient.


Date of Decision:  Jul 30, 2018


Time of Decision:  20:17


Patient is Medically Stable at:  Yes


Disposition:  [facility]





Problem Qualifiers











SARI NEVAREZ LPC Jul 30, 2018 20:20

## 2018-07-30 NOTE — HOSPITALIST PROGRESS NOTE
Subjective


Progress Notes


Subjective


He was admitted for alcohol withdraw. He had no acute events overnight. He is 

still scoring 16 and above on his CIWA assessments.





Patient Complains of:


Cardiovascular:  No: Chest Pain


Respiratory:  No: Shortness of Breath





Physical Exam





Vital Signs








  Date Time  Temp Pulse Resp B/P (MAP) Pulse Ox O2 Delivery O2 Flow Rate FiO2


 


7/30/18 08:55     92   


 


7/30/18 07:28  88      


 


7/30/18 07:28      Room Air  


 


7/30/18 06:09 98.1  28 154/89 (110)   2.0 














Intake and Output 


 


 7/31/18





 01:00


 


Intake Total 1770 ml


 


Balance 1770 ml


 


 


 


Intake Oral 1770 ml


 


# Voids 1








General Appearance:  Alert, Awake, No Acute Distress


Cardiovascular:  Regular Rate and Rhythm


Respiratory:  No Respiratory Distress, Clear to Auscultation


GI:  Soft and Non-Tender


Psych:  Alert & Oriented X3, Appropriate Mood & Affect


Result Diagram:  


7/29/18 1606                                                                   

             7/29/18 1606





Monitor Interpretation:  Sinus Tachycardia





Assessment and Plan


Problems:  


(1) Alcohol abuse


Status:  Chronic


Assessment & Plan:  The patient has a long history of etoh abuse and has been 

to alcohol rehab 3 times. He would like to detox and then go to [place] for 

rehabilitation.  We will have social work help him with coordination to rehab 

in [place] upon discharge. 





(2) Delirium tremens


Status:  Acute


Assessment & Plan:  He has continued to have hallucinations.  He is on CIWA 

assessments and has been receiving IV Ativan. We will start Valium today per 

protocol. He is on telemetry and seizure precautions. Will need to monitor 

closely. We will add folic acid and Thiamine. He will get a banana bag today. 








Exam


Sepsis Risk:  No Definite Risk











MISTY GARSIA FNP Jul 30, 2018 10:47

## 2018-07-31 VITALS — SYSTOLIC BLOOD PRESSURE: 147 MMHG | DIASTOLIC BLOOD PRESSURE: 95 MMHG

## 2018-07-31 VITALS — DIASTOLIC BLOOD PRESSURE: 83 MMHG | SYSTOLIC BLOOD PRESSURE: 150 MMHG

## 2018-07-31 VITALS — DIASTOLIC BLOOD PRESSURE: 92 MMHG | SYSTOLIC BLOOD PRESSURE: 147 MMHG

## 2018-07-31 RX ADMIN — Medication SCH MG: at 08:26

## 2018-07-31 RX ADMIN — NICOTINE PRN MG: 4 INHALANT RESPIRATORY (INHALATION) at 19:12

## 2018-07-31 RX ADMIN — FOLIC ACID SCH MG: 1 TABLET ORAL at 08:26

## 2018-07-31 RX ADMIN — NICOTINE PRN MG: 4 INHALANT RESPIRATORY (INHALATION) at 11:01

## 2018-07-31 RX ADMIN — NICOTINE PRN MG: 4 INHALANT RESPIRATORY (INHALATION) at 16:37

## 2018-07-31 RX ADMIN — NICOTINE PRN MG: 4 INHALANT RESPIRATORY (INHALATION) at 08:00

## 2018-07-31 NOTE — HISTORY AND PHYSICAL
DATE OF ADMISSION:  July 30, 2018



Patient was seen at approximately 0900 hours on the a.m. of 31 July 2018 for 
note concerning this dictation.  



PRESENTING PROBLEM/CHIEF COMPLAINT

Alcohol withdrawal. 



HISTORY OF PRESENT ILLNESS 

This is a 23-year-old male who recently was seen in the Emergency Room on July 26, 2018.  Patient was prepared to come to Behavioral Health at that time for 
help with alcohol withdrawal; patient, however, refusing and leaving the 
Emergency Room.  Patient then returned on July 29, 2018, in active alcohol 
withdrawal and experiencing delirium tremens.  Patient was placed on the 
Medical Floor for treatment on July 30, 2018.  The patient indicated a desire 
to go against medical advice.  Patient had been experiencing hallucinations, 
and alcohol withdrawal was not considered complete.  Behavioral Health staff 
met with patient on Medical Floor.  It was found patient had recently been in 
[facility], exiting only two to three weeks ago.  Patient's mother was contacted and 
she indicated an extreme level of alcoholism, and she was very concerned.  The 
patient currently had been evicted from his parents' home and had been living 
here in [place] just briefly.  This combined with patient's ongoing psychosis, 
delirium tremens, and unfinished alcohol withdrawal treatment, patient was 
emergency detained.  Patient brought to Behavioral Health Unit without 
incident.  Patient calm and cooperative on floor.  Further treatment with 
Valium per [facility] protocol was given.  Patient reports during initial interview 
that he knows he suffers from severe alcoholism and does want to quit.  Patient 
reportedly wants to go to [place], [place], to enter a rehab there.  Patient 
reports his parents, although they have recently evicted him from their home in 
[place], patient reports they are funding this rehab program in [place] and 
desperately want him to complete it.  Patient denies any significant depressive 
symptoms at this time, although his mother had reported that patient gets very 
depressed while actively intoxicated and is afraid he is dying.  Patient 
himself states, "I know I am killing myself, but I don't want to commit suicide,
" referring to his ongoing severe alcoholism at a very young age.  Patient 
reports that using marijuana, especially in association with alcohol, can cause 
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 
withdrawal.  Patient denies any other symptoms of psychiatric concern.  



MENTAL HEALTH HISTORY 

Patient has been an inpatient before.  He has been at [facility] as recently at two to 
three weeks ago.  He has had two residential treatments in [place] as well.  
Patient had been scheduled to see an outpatient provider in [place] upon 
departure from [facility], has not done so yet.  Patient was prescribed recently 
naltrexone, Prozac, and Antabuse as well.  He has been on Wellbutrin in the 
past.  Patient has no history of suicide attempt.  



FAMILY PSYCHIATRIC HISTORY 

Patient reports his mother suffers from anxiety and bipolar.  An uncle on his 
mother's side may suffer from alcoholism.  There are no suicides in the family 
history. 



PAST MEDICAL HISTORY 

* Gastritis.  

* Patient reports hypertension that may not be related to alcohol use in this 
young 23-year-old.  Patient reports he believes it could be familial in nature.
  



ALLERGIES 

Denies any allergies.



SOCIAL HISTORY 

Patient was born in [place], raised in [facility].  Parents were  at 
the time of his birth.  They still are.  Patient is the middle child, has one 
brother and one sister.  He is not a high school graduate, but did obtain an 
GED.  Patient has had no  service.  He is not , has no children.
  No significant other now.  Patient does consider himself heterosexual.  
Patient after being recently evicted from his parents' home for drinking in 
their home in [place], patient has been living here with a friend here in 
[place].  Patient is not working.  



LEGAL HISTORY 

Patient reports a minor in possession charge at the age of 22, but no other 
legal history.



SUBSTANCE ABUSE HISTORY 

Patient reports, "I've used a ton of Adderall," referring to outpatient scripts 
that were not prescribed to him in the past.  Patient reports not using any 
now.  Patient continues to use cannabis in a moderate level, and alcohol 
remains his primary drug of choice.  Patient has a history of abusing opiates 
as well.  



PHYSICAL EXAMINATION 

Please see emergency room note and medical floor notes.  Notable for:

GENERAL:  Delirium in a 23-year-old male going through active alcohol withdrawal
, and alcohol withdrawal not considered complete.  Patient requesting to go 
against medical advice.  

VITAL SIGNS:  At time of admission to Behavioral Health, temperature 97.9, 
pulse 89, blood pressure 155/89, respiratory rate 16, pulse oximetry 92% on 
room air.  



LABORATORY DATA 

CBC notable for red blood cells elevated at 5.77.  Chemistry panel, AST 
elevated at 79.  Lipase 71, normal limits.  Urinalysis unremarkable.  
Toxicology screen positive for cannabis with a serum alcohol level of 324 upon 
admission to the Medical Floor.  



MENTAL STATUS EXAMINATION

GENERAL APPEARANCE, BEHAVIOR, AND ATTITUDE:  At time of initial interview, he 
was a calm, polite, 23-year-old male.  No psychomotor agitation or retardation 
noted.  Patient actively being treated for alcohol withdrawal with Valium per 
[facility] protocol.  Patient making good eye contact.  Brief periods of tearfulness 
when talking about letting his family down.  No bizarre mannerisms or tics.  

SPEECH:  Within normal limits.  Regular rate, rhythm, volume, and tone. 

MOOD:  Described as improving.

AFFECT:  Mildly constricted, mood congruent overall.

THOUGHT PROCESSES:  Goal directed, logical, patient indicating the desire to 
attend a rehab program in [place], [place].  No loose associations or flight of 
ideas.

THOUGHT CONTENT:  Free of auditory or visual hallucinations, ideas of reference
, thought broadcasting, delusions, obsessions, compulsions.  Patient adamantly 
denying suicidal or homicidal ideation.

SENSORIUM:  Clear. 

COGNITION:  Alert and oriented to person, place, time, and situation. 

MEMORY:  Immediate, recent, and remote estimated intact.   

INTELLIGENCE:  Average based on interview. 

INSIGHT AND JUDGMENT:  Considered grossly intact in the absence of alcohol or 
illicit substance use.  



ASSESSMENT 

This is a polite, 23-year-old male, patient emergency detained after he was 
attempting to discharge against medical advice before alcohol withdrawal could 
be treated to completion.  Patient noted to be hallucinating and had been 
recently discharged from a hospital in [place].  At this time, will continue to 
treat alcohol withdrawal to completion.  Will get in contact with family 
members and arrange for possible transport to [place], [place], for residential 
rehabilitation.



DIAGNOSES PER DIAGNOSTIC AND STATISTICAL MANUAL OF MENTAL DISORDERS, FIFTH 
EDITION 

1.  Alcohol use disorder, severe.  

2.  Alcohol withdrawal with ongoing hallucinosis and delirium tremens present.

3.  Cannabis use disorder, moderate.

4.  Rule out anxiety disorder, unspecified.

5.  Social stressors associated with alcohol dependence. 



PLAN 

1.  Admit to the unit.

2.  Necessary precautions to be implemented.

3.  Patient will participate in individual and group therapy.

4.  Medications will be administered and titrated accordingly.

5.  Collateral information will be obtained as necessary.

6.  Estimated length of stay three to five days. 
MTDD

## 2018-08-01 VITALS — DIASTOLIC BLOOD PRESSURE: 72 MMHG | SYSTOLIC BLOOD PRESSURE: 142 MMHG

## 2018-08-01 VITALS — DIASTOLIC BLOOD PRESSURE: 88 MMHG | SYSTOLIC BLOOD PRESSURE: 139 MMHG

## 2018-08-01 VITALS — SYSTOLIC BLOOD PRESSURE: 131 MMHG | DIASTOLIC BLOOD PRESSURE: 75 MMHG

## 2018-08-01 RX ADMIN — Medication SCH MG: at 08:39

## 2018-08-01 RX ADMIN — NICOTINE PRN MG: 4 INHALANT RESPIRATORY (INHALATION) at 08:42

## 2018-08-01 RX ADMIN — NICOTINE PRN MG: 4 INHALANT RESPIRATORY (INHALATION) at 12:39

## 2018-08-01 RX ADMIN — NICOTINE PRN MG: 4 INHALANT RESPIRATORY (INHALATION) at 19:41

## 2018-08-01 RX ADMIN — FOLIC ACID SCH MG: 1 TABLET ORAL at 08:39

## 2018-08-01 RX ADMIN — NICOTINE PRN MG: 4 INHALANT RESPIRATORY (INHALATION) at 17:40

## 2018-08-01 NOTE — BHS PROGRESS NOTE
BHS - Subjective


Progress Notes


Subjective


Patient remaining cooperative on the unit, no complaints today, alcohol 

withdrawal nearing completion.  [facility].  

Sleep and mood improving.


Suicidal Ideation:  None


Homicidal Ideation:  None





BHS - Objective


Physical Exam


Vital Signs





Vital Signs








  Date Time  Temp Pulse Resp B/P (MAP) Pulse Ox O2 Delivery O2 Flow Rate FiO2


 


8/1/18 06:00 97.7 71 15 142/72 (95) 93 Room Air  








Muscle Strength and Tone:  WNL


Gait and Station:  Steady


[facility] Medications Reviewed:  Side Effects, Benefits of Medication, Risks


Allergies Reviewed:  Yes





Mental Status Exam


General Appearance:  Casual, Well Groomed, Good Eye Contact, Cooperative, Polite

, Good Interaction, No Psychomotor Agitation, No Psychomotor Retardation, No 

Bizarre Mannerisms, No Tics


Speech:  Clear, Spontaneous, Normal Rate, Normal Rhythm, Normal Volume, Normal 

Tone


Mood:  Dysthmic/Depressed (frustrated, improving)


Affect:  Calm, Neutral, No Tearful, No Anxious, No Agitated


Thought Process:  Organized, Logical, Goal Directed (verbalizes desire to enter 

rehab), No Loose Associations, No Flight of Ideas


Thought Content:  No Suicidal Ideation, No Homicidal Ideation, No Delusions, No 

Auditory Halllucinations, No Visual Hallucinations, No Thought Broadcasting, No 

Ideas of Reference, No Obsessions, No Compulsions


Sensorium:  Clear


Cognition:  Alert & Oriented-Person, Alert & Oriented-Place, Alert & Oriented-

Time, Alert-Oriented-Situation


Memory:  Immediate, Recent, Remote


Intelligence:  Average


Insight Judgment:  Poor (severe alcohol use disorder)





BHS Assessment and Plan


Face-to-Face Encounter Date:  Aug 1, 2018


Face-to-Face Encounter Time:  09:00


[facility] Plan:  Necessary Precautions, Individual/Group Therapy, Admin/Titrate Meds, 

Educate Patient


Tobacco Medications:  Started


Multpiple Antipsychotics Used:  No


Problems:  


(1) Alcohol use disorder, severe, in controlled environment


Status:  Chronic


(2) Alcohol withdrawal


Status:  Chronic


(3) Cannabis use disorder, severe, in controlled environment


Status:  Chronic


Condition


1. continue treatment.


2. plan discharge.





Problem Qualifiers





(1) Alcohol withdrawal:  


Complication of substance-induced condition:  with delirium  Qualified Codes:  

F10.231 - Alcohol dependence with withdrawal delirium








JELANI DORADO MD Aug 1, 2018 09:34

## 2018-08-02 VITALS — DIASTOLIC BLOOD PRESSURE: 88 MMHG | SYSTOLIC BLOOD PRESSURE: 132 MMHG

## 2018-08-02 RX ADMIN — NICOTINE PRN MG: 4 INHALANT RESPIRATORY (INHALATION) at 10:31

## 2018-08-02 RX ADMIN — Medication SCH MG: at 08:42

## 2018-08-02 RX ADMIN — FOLIC ACID SCH MG: 1 TABLET ORAL at 08:42

## 2018-08-03 NOTE — [facility]F DISCHARGE
ATTENDING PHYSICIAN

Jett Smith MD



The patient was seen at approximately 0800 hours on August 2, 2018 for note 
concerning this dictation.



FINAL DIAGNOSES

1.  Alcohol use disorder.

2.  Alcohol withdrawal, which was complicated and resolved.

3.  Cannabis use disorder, moderate; rule out persisting depressive disorder.  

4.  Patient suffering from probable sleep apnea and need for sleep study. 

5.  Supportive relationship overall with parents.



REASON FOR ADMISSION

This is a 23-year-old male who suffers from significant alcohol use disorder at 
a very young age.  Patient in and out of treatment for alcohol most recently 
living in [place] with his parents having exited alcohol treatment.  Please see 
history and physical for full details.  However, the patient resumed drinking 
in his parents home and he was evicted.  The patient came to [place] to stay 
with friends, where he continued drinking.  The patient was admitted through 
the ER in a state of delirium tremens and was placed initially for observation 
in medical floor. Patient was then emergency detained for presentation and 
overall history of severe and unrelenting alcohol use.  Patient was then 
transferred to behavioral health, where alcohol withdrawal management was 
treated to completion.  The patient was cooperative overall with his treatment.
  The patient would enter rehab program in [place], [place], funded by [facility].
  Patient would travel there upon discharge from behavioral health.  Patient 
accepting of pain.  Patient did indicate a verbal desire to quit alcohol.  
However, his history suggests this will be very difficult for this young 
patient with, again, severe alcohol use disorder.  Patient was cooperative and 
did take an active role in his treatment.  No parasuicidal or aggressive 
behaviors were seen while on the Unit and alcohol withdrawal was treated to 
completion.



PHYSICAL EXAMINATION

Please see emergency room note. Notable for a 23-year-old male in active 
delirium tremens.  Patient has a known history of this as well. Vital signs at 
the time of admission from the Emergency Room:  Pulse 114, respiratory rate 18, 
blood pressure 146/85 and pulse oximetry 94% on room air.  Vital signs at the 
time of discharge from Behavioral Health:  Temperature 98.5, pulse 84, 
respiratory rate 16, blood pressure 132/88 and pulse oximetry 95% on room air.  



LABORATORY DATA

CBC on July 29, 2018 is overall unremarkable.  CMP on July 29, 2018 notable for 
AST elevated at 79, lipase 71 and normal limits.  Urinalysis overall 
unremarkable.  Toxicology screen notable for serum alcohol upon admission of 324
, positive for cannabis as well, negative for other substances of abuse.



MENTAL STATUS EXAMINATION

GENERAL APPEARANCE, BEHAVIOR AND ATTITUDE:  This is pleasant 23-year old male 
making good eye contact.  No bizarre mannerisms or tics and no periods of 
tearfulness.  Patient interacting well with his father on the phone.  During 
his stay, patient interacting well with staff, other patients and this provider.

SPEECH:  Within normal limits.  Regular rate, rhythm, volume and tone.

MOOD:  Described as frustrated but improved. 

AFFECT:  Mildly constricted and mood-congruent.  

THOUGHT PROCESSES:  Logical and goal-directed, no loose associations or flight 
of ideas.  

THOUGHT CONTENT:   Free of auditory or visual hallucinations, ideas of reference
, thought broadcastings, delusions, obsessions or compulsions.  The patient is 
adamantly denying suicidal or homicidal ideation.

SENSORIUM:  Clear.

COGNITION:  Alert and oriented to person, place, time and situation.  

MEMORY:  Immediate, recent and remote was estimated intact.

INTELLIGENCE:  Average, based on interview.  

INSIGHT AND JUDGMENT:  Grossly intact and appropriate for entrance into 
residential rehab program in the absence of alcohol and substance use.



RESULTS OF TESTING

Imaging:  None.  Laboratory data:  None. 



CONSULTATIONS

None.



TREATMENT

Patient received medications, participated in individual and group therapy.



HOSPITAL COURSE

This is a very young 23-year-old male who at a very young age suffers from 
severe alcohol use disorder.  The patient has been battling this for quite some 
time already.  Patient in and out of treatment programs.  Patient seems to 
relapse into drinking almost immediately.  Patient will enter rehab at time of 
exit in [place], [place].  Patient also known to have relatives in the area and 
[place] as well.  Patient's alcohol withdrawal and delirium were treated to 
completion.  Patient in need of sleep study.  Patient discharged to [facility] 
Residential Treatment.



CONDITION OF PATIENT ON DISCHARGE

Stable.  Considered a minimal risk to himself or others, appropriate for 
entrance into rehab program.   



DISPOSITION

The patient was discharged to enter rehab in [place].  The patient agreed to 
abstain from alcohol and illicit substances.  Patient would follow up with 
sleep study in [place] as he did not want to schedule here in [place] as he 
would not return here.  He was given the crisis line should symptoms return.  
The patient would remain on trazodone  mg at bedtime, Prozac 20 mg daily, 
Folic Acid 1 mg over-the-counter daily, multivitamin with minerals daily, 
Thiamine 100 mg daily.  The patient is encouraged to stop smoking as well and 
could use nicotine replacement if necessary.  The risks, benefits and 
alternatives of the above discharge plan were discussed.  Informed consent was 
given to proceed with the above discharge plan by this cooperative patient.  
Patient's parents are aware of discharge plan.

HUGH
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
